# Patient Record
Sex: FEMALE | Race: WHITE | Employment: FULL TIME
[De-identification: names, ages, dates, MRNs, and addresses within clinical notes are randomized per-mention and may not be internally consistent; named-entity substitution may affect disease eponyms.]

---

## 2017-06-17 ENCOUNTER — HEALTH MAINTENANCE LETTER (OUTPATIENT)
Age: 57
End: 2017-06-17

## 2017-07-11 DIAGNOSIS — R00.2 PALPITATIONS: Primary | ICD-10-CM

## 2017-07-11 DIAGNOSIS — R94.31 ABNORMAL ELECTROCARDIOGRAM: ICD-10-CM

## 2017-07-14 ENCOUNTER — HOSPITAL ENCOUNTER (OUTPATIENT)
Dept: CARDIOLOGY | Facility: CLINIC | Age: 57
Discharge: HOME | End: 2017-07-14
Attending: PHYSICIAN ASSISTANT | Admitting: PHYSICIAN ASSISTANT
Payer: COMMERCIAL

## 2017-07-14 DIAGNOSIS — R00.2 PALPITATIONS: ICD-10-CM

## 2017-07-14 DIAGNOSIS — R94.31 ABNORMAL ELECTROCARDIOGRAM: ICD-10-CM

## 2017-07-14 PROCEDURE — 93225 XTRNL ECG REC<48 HRS REC: CPT

## 2017-07-14 PROCEDURE — 93227 XTRNL ECG REC<48 HR R&I: CPT | Performed by: INTERNAL MEDICINE

## 2017-07-24 ENCOUNTER — PRE VISIT (OUTPATIENT)
Dept: CARDIOLOGY | Facility: CLINIC | Age: 57
End: 2017-07-24

## 2017-07-31 ENCOUNTER — OFFICE VISIT (OUTPATIENT)
Dept: CARDIOLOGY | Facility: CLINIC | Age: 57
End: 2017-07-31
Payer: COMMERCIAL

## 2017-07-31 VITALS
DIASTOLIC BLOOD PRESSURE: 72 MMHG | SYSTOLIC BLOOD PRESSURE: 116 MMHG | WEIGHT: 186 LBS | BODY MASS INDEX: 28.19 KG/M2 | HEIGHT: 68 IN | HEART RATE: 60 BPM

## 2017-07-31 DIAGNOSIS — R00.2 PALPITATIONS: Primary | ICD-10-CM

## 2017-07-31 PROCEDURE — 93000 ELECTROCARDIOGRAM COMPLETE: CPT | Performed by: INTERNAL MEDICINE

## 2017-07-31 PROCEDURE — 99204 OFFICE O/P NEW MOD 45 MIN: CPT | Mod: 25 | Performed by: INTERNAL MEDICINE

## 2017-07-31 RX ORDER — ACYCLOVIR 800 MG/1
TABLET ORAL
Refills: 4 | COMMUNITY
Start: 2017-07-08

## 2017-07-31 RX ORDER — LEVOTHYROXINE SODIUM 125 UG/1
TABLET ORAL
Refills: 0 | COMMUNITY
Start: 2017-07-10

## 2017-07-31 RX ORDER — ALBUTEROL SULFATE 90 UG/1
AEROSOL, METERED RESPIRATORY (INHALATION)
Refills: 1 | COMMUNITY
Start: 2016-09-14

## 2017-07-31 NOTE — MR AVS SNAPSHOT
"              After Visit Summary   7/31/2017    Radha Turner    MRN: [id number]           Patient Information     Date Of Birth          1960        Visit Information        Provider Department      7/31/2017 2:45 PM John Jimenez MD [facility]        Today's Diagnoses     Palpitations    -  1       Follow-ups after your visit        Future tests that were ordered for you today     Open Future Orders        Priority Expected Expires Ordered    Echocardiogram Routine 8/7/2017 7/31/2018 7/31/2017            Who to contact     If you have questions or need follow up information about today's clinic visit or your schedule please contact [facility] directly at [phone].  Normal or non-critical lab and imaging results will be communicated to you by MyChart, letter or phone within 4 business days after the clinic has received the results. If you do not hear from us within 7 days, please contact the clinic through MyChart or phone. If you have a critical or abnormal lab result, we will notify you by phone as soon as possible.  Submit refill requests through Friday or call your pharmacy and they will forward the refill request to us. Please allow 3 business days for your refill to be completed.          Additional Information About Your Visit        MyChart Information     Friday lets you send messages to your doctor, view your test results, renew your prescriptions, schedule appointments and more. To sign up, go to [email or web address] . Click on \"Log in\" on the left side of the screen, which will take you to the Welcome page. Then click on \"Sign up Now\" on the right side of the page.     You will be asked to enter the access code listed below, as well as some personal information. Please follow the directions to create your username and password.     Your access code is: [email or web address]  Expires: 10/29/2017  3:10 PM     Your " "access code will  in 90 days. If you need help or a new code, please call your [place] clinic or [phone].        Care EveryWhere ID     This is your Care EveryWhere ID. This could be used by other organizations to access your [place] medical records  [id number]        Your Vitals Were     Pulse Height BMI (Body Mass Index)             60 1.715 m (5' 7.5\") 28.7 kg/m2          Blood Pressure from Last 3 Encounters:   17 116/72    Weight from Last 3 Encounters:   17 84.4 kg (186 lb)              We Performed the Following     EKG 12-lead complete w/read - Clinics (performed today)        Primary Care Provider Office Phone # Fax #    Randa Yarbrough [phone]       [facility] [address] [facility] [address]  [place] [address]        Equal Access to Services     [facility]: Hadii aad ku hadasho Soomaali, waaxda luqadaha, qaybta kaalmada adeegyada, waxay idiin hayaan adetimothy gentile . So [facility] [phone].    ATENCIÓN: Si habla español, tiene a hebert disposición servicios gratuitos de asistencia lingüística. Cosmo al [phone].    We comply with applicable federal civil rights laws and [place] laws. We do not discriminate on the basis of race, color, national origin, age, disability sex, sexual orientation or gender identity.            Thank you!     Thank you for choosing [facility] [place]  for your care. Our goal is always to provide you with excellent care. Hearing back from our patients is one way we can continue to improve our services. Please take a few minutes to complete the written survey that you may receive in the mail after your visit with us. Thank you!             Your Updated Medication List - Protect others around you: Learn how to safely use, store and throw away your medicines at www.disposemymeds.org.          This list is accurate as of: 17  3:10 PM.  Always use your most recent med list.             "       Brand Name Dispense Instructions for use Diagnosis    acyclovir 800 MG tablet    ZOVIRAX     TK 1 T PO BID FOR 3 TO 5 DAYS PRN.        albuterol 108 (90 BASE) MCG/ACT Inhaler   Generic drug:  albuterol      INL 2 PFS PO QID PRN        levothyroxine 125 MCG tablet    SYNTHROID/LEVOTHROID     TK 1 T PO D

## 2017-07-31 NOTE — LETTER
7/31/2017    [facility]   [address]  [facility] [address]    RE: Radha Turner       Dear Colleague,    I had the pleasure of seeing Radha Turner in the [facility].    HPI and Plan:   She has recurrent short lasting palpitation for the last few weeks. Presented to PMD's office with worsening symptoms a few days ago. ECG recording at that time showed sinus bradycardia and PACs. The dose of Levothyroxine has been reduced since. She has felt better with only occasionally short lasting palpitation.  No other severe symptoms.  Denies fatigue or SOB. No syncope. Holter showed average HR 58 bpm, maximal  bpm. Pauses <2.2 sec.  1655 PACs, with short runs of atrial tachycardia. Palpitation correlated with PACs.    Today's ECG normal.    Ordered an echo.  Recommend observation for now. Can accept occasional short lasting palpitation.  Will be very cautious about using antiarrhythmic drug because of sinus bradycardia.  No restriction for activities for now.  Follow up PRN.    Orders Placed This Encounter   Procedures     EKG 12-lead complete w/read - Clinics (performed today)     Echocardiogram       Orders Placed This Encounter   Medications     levothyroxine (SYNTHROID/LEVOTHROID) 125 MCG tablet     Sig: TK 1 T PO D     Refill:  0     ALBUTEROL 108 (90 BASE) MCG/ACT inhaler     Sig: INL 2 PFS PO QID PRN     Refill:  1     acyclovir (ZOVIRAX) 800 MG tablet     Sig: TK 1 T PO BID FOR 3 TO 5 DAYS PRN.     Refill:  4       There are no discontinued medications.      Encounter Diagnosis   Name Primary?     Palpitations Yes       CURRENT MEDICATIONS:  Current Outpatient Prescriptions   Medication Sig Dispense Refill     levothyroxine (SYNTHROID/LEVOTHROID) 125 MCG tablet TK 1 T PO D  0     ALBUTEROL 108 (90 BASE) MCG/ACT inhaler INL 2 PFS PO QID PRN  1     acyclovir (ZOVIRAX) 800 MG tablet TK 1 T PO BID FOR 3 TO 5 DAYS PRN.  4       ALLERGIES    "Not on File    PAST MEDICAL HISTORY:  Past Medical History:   Diagnosis Date     Atrial tachycardia (H)     short runs     Hypothyroid      Sinus bradycardia        PAST SURGICAL HISTORY:  History reviewed. No pertinent surgical history.    FAMILY HISTORY:  Family History   Problem Relation Age of Onset     Suicide Mother      Parkinsonism Father      Heart Failure Maternal Grandmother      Heart Failure Maternal Aunt        SOCIAL HISTORY:  Social History     Social History     Marital status:      Spouse name: N/A     Number of children: N/A     Years of education: N/A     Social History Main Topics     Smoking status: Former Smoker     Smokeless tobacco: Never Used     Alcohol use Yes      Comment: socially on weekends     Drug use: Yes     Special: Marijuana     Sexual activity: Not Asked     Other Topics Concern     Parent/Sibling W/ Cabg, Mi Or Angioplasty Before 65f 55m? No     Social History Narrative     None       Review of Systems:  Skin:  Negative       Eyes:  Positive for glasses    ENT:  Negative      Respiratory:  Negative       Cardiovascular:    Positive for;palpitations;dizziness positional dizziness  Gastroenterology: Negative      Genitourinary:  Negative      Musculoskeletal:  Negative      Neurologic:  Negative      Psychiatric:  Negative      Heme/Lymph/Imm:  Negative      Endocrine:  Positive for thyroid disorder      Physical Exam:  Vitals: /72  Pulse 60  Ht 1.715 m (5' 7.5\")  Wt 84.4 kg (186 lb)  BMI 28.7 kg/m2    Constitutional:  cooperative, alert and oriented, well developed, well nourished, in no acute distress        Skin:  warm and dry to the touch, no apparent skin lesions or masses noted        Head:  normocephalic, no masses or lesions        Eyes:  pupils equal and round, conjunctivae and lids unremarkable, sclera white, no xanthalasma, EOMS intact, no nystagmus        ENT:  no pallor or cyanosis, dentition good        Neck:  carotid pulses are full and equal " bilaterally, JVP normal, no carotid bruit, no thyromegaly        Chest:  normal breath sounds, clear to auscultation, normal A-P diameter, normal symmetry, normal respiratory excursion, no use of accessory muscles          Cardiac: regular rhythm, normal S1/S2, no S3 or S4, apical impulse not displaced, no murmurs, gallops or rubs                  Abdomen:  abdomen soft, non-tender, BS normoactive, no mass, no HSM, no bruits        Vascular: pulses full and equal, no bruits auscultated                                        Extremities and Back:  no deformities, clubbing, cyanosis, erythema observed              Neurological:  affect appropriate, oriented to time, person and place        Thank you for allowing me to participate in the care of your patient.    Sincerely,     John Jimenez MD     [facility]

## 2017-07-31 NOTE — PROGRESS NOTES
HPI and Plan:   She has recurrent short lasting palpitation for the last few weeks. Presented to PMD's office with worsening symptoms a few days ago. ECG recording at that time showed sinus bradycardia and PACs. The dose of Levothyroxine has been reduced since. She has felt better with only occasionally short lasting palpitation.  No other severe symptoms.  Denies fatigue or SOB. No syncope. Holter showed average HR 58 bpm, maximal  bpm. Pauses <2.2 sec.  1655 PACs, with short runs of atrial tachycardia. Palpitation correlated with PACs.    Today's ECG normal.    Ordered an echo.  Recommend observation for now. Can accept occasional short lasting palpitation.  Will be very cautious about using antiarrhythmic drug because of sinus bradycardia.  No restriction for activities for now.  Follow up PRN.    Orders Placed This Encounter   Procedures     EKG 12-lead complete w/read - Clinics (performed today)     Echocardiogram       Orders Placed This Encounter   Medications     levothyroxine (SYNTHROID/LEVOTHROID) 125 MCG tablet     Sig: TK 1 T PO D     Refill:  0     ALBUTEROL 108 (90 BASE) MCG/ACT inhaler     Sig: INL 2 PFS PO QID PRN     Refill:  1     acyclovir (ZOVIRAX) 800 MG tablet     Sig: TK 1 T PO BID FOR 3 TO 5 DAYS PRN.     Refill:  4       There are no discontinued medications.      Encounter Diagnosis   Name Primary?     Palpitations Yes       CURRENT MEDICATIONS:  Current Outpatient Prescriptions   Medication Sig Dispense Refill     levothyroxine (SYNTHROID/LEVOTHROID) 125 MCG tablet TK 1 T PO D  0     ALBUTEROL 108 (90 BASE) MCG/ACT inhaler INL 2 PFS PO QID PRN  1     acyclovir (ZOVIRAX) 800 MG tablet TK 1 T PO BID FOR 3 TO 5 DAYS PRN.  4       ALLERGIES   Not on File    PAST MEDICAL HISTORY:  Past Medical History:   Diagnosis Date     Atrial tachycardia (H)     short runs     Hypothyroid      Sinus bradycardia        PAST SURGICAL HISTORY:  History reviewed. No pertinent surgical history.    FAMILY  "HISTORY:  Family History   Problem Relation Age of Onset     Suicide Mother      Parkinsonism Father      Heart Failure Maternal Grandmother      Heart Failure Maternal Aunt        SOCIAL HISTORY:  Social History     Social History     Marital status:      Spouse name: N/A     Number of children: N/A     Years of education: N/A     Social History Main Topics     Smoking status: Former Smoker     Smokeless tobacco: Never Used     Alcohol use Yes      Comment: socially on weekends     Drug use: Yes     Special: Marijuana     Sexual activity: Not Asked     Other Topics Concern     Parent/Sibling W/ Cabg, Mi Or Angioplasty Before 65f 55m? No     Social History Narrative     None       Review of Systems:  Skin:  Negative       Eyes:  Positive for glasses    ENT:  Negative      Respiratory:  Negative       Cardiovascular:    Positive for;palpitations;dizziness positional dizziness  Gastroenterology: Negative      Genitourinary:  Negative      Musculoskeletal:  Negative      Neurologic:  Negative      Psychiatric:  Negative      Heme/Lymph/Imm:  Negative      Endocrine:  Positive for thyroid disorder      Physical Exam:  Vitals: /72  Pulse 60  Ht 1.715 m (5' 7.5\")  Wt 84.4 kg (186 lb)  BMI 28.7 kg/m2    Constitutional:  cooperative, alert and oriented, well developed, well nourished, in no acute distress        Skin:  warm and dry to the touch, no apparent skin lesions or masses noted        Head:  normocephalic, no masses or lesions        Eyes:  pupils equal and round, conjunctivae and lids unremarkable, sclera white, no xanthalasma, EOMS intact, no nystagmus        ENT:  no pallor or cyanosis, dentition good        Neck:  carotid pulses are full and equal bilaterally, JVP normal, no carotid bruit, no thyromegaly        Chest:  normal breath sounds, clear to auscultation, normal A-P diameter, normal symmetry, normal respiratory excursion, no use of accessory muscles          Cardiac: regular rhythm, " normal S1/S2, no S3 or S4, apical impulse not displaced, no murmurs, gallops or rubs                  Abdomen:  abdomen soft, non-tender, BS normoactive, no mass, no HSM, no bruits        Vascular: pulses full and equal, no bruits auscultated                                        Extremities and Back:  no deformities, clubbing, cyanosis, erythema observed              Neurological:  affect appropriate, oriented to time, person and place              CC  No referring provider defined for this encounter.

## 2017-08-01 ENCOUNTER — HOSPITAL ENCOUNTER (OUTPATIENT)
Dept: CARDIOLOGY | Facility: CLINIC | Age: 57
Discharge: HOME | End: 2017-08-01
Attending: INTERNAL MEDICINE | Admitting: INTERNAL MEDICINE
Payer: COMMERCIAL

## 2017-08-01 DIAGNOSIS — R00.2 PALPITATIONS: ICD-10-CM

## 2017-08-01 PROCEDURE — 93306 TTE W/DOPPLER COMPLETE: CPT | Mod: 26 | Performed by: INTERNAL MEDICINE

## 2017-08-01 PROCEDURE — 93306 TTE W/DOPPLER COMPLETE: CPT

## 2017-08-02 ENCOUNTER — TELEPHONE (OUTPATIENT)
Dept: CARDIOLOGY | Facility: CLINIC | Age: 57
End: 2017-08-02

## 2017-08-02 NOTE — TELEPHONE ENCOUNTER
Complete Echo Adult.  _____________________________________________________________________________  __        Interpretation Summary     Left ventricular systolic function is normal.  The visual ejection fraction is estimated at 60-65%.  Normal left ventricular diastolic function  The right ventricle is normal in structure, function and size.  Normal biatrial dimensions.  No hemodynamically significant valvular abnormalities.  Normal transthoracic echocardiogram. There is no comparison study available.  _____________________________________________________________________________    Writer attempted to call pt with results of echo as above, but no answer. VM left to return our phone call. CLEO Campoverde RN.

## 2017-08-02 NOTE — TELEPHONE ENCOUNTER
Patient calling back for echo results. Reviewed the findings with her. She asked if this would go to [facility]. No but  I will fax over copy of results. Laura RAIN

## 2018-09-10 ENCOUNTER — HOSPITAL ENCOUNTER (OUTPATIENT)
Dept: MAMMOGRAPHY | Facility: CLINIC | Age: 58
Discharge: HOME | End: 2018-09-10
Attending: PHYSICIAN ASSISTANT | Admitting: PHYSICIAN ASSISTANT
Payer: COMMERCIAL

## 2018-09-10 DIAGNOSIS — Z12.31 VISIT FOR SCREENING MAMMOGRAM: ICD-10-CM

## 2018-09-10 PROCEDURE — 77067 SCR MAMMO BI INCL CAD: CPT

## 2019-09-11 ENCOUNTER — HOSPITAL ENCOUNTER (OUTPATIENT)
Dept: MAMMOGRAPHY | Facility: CLINIC | Age: 59
Discharge: HOME | End: 2019-09-11
Attending: PHYSICIAN ASSISTANT | Admitting: PHYSICIAN ASSISTANT
Payer: COMMERCIAL

## 2019-09-11 DIAGNOSIS — Z12.31 VISIT FOR SCREENING MAMMOGRAM: ICD-10-CM

## 2019-09-11 PROCEDURE — 77063 BREAST TOMOSYNTHESIS BI: CPT

## 2020-10-01 ENCOUNTER — HOSPITAL ENCOUNTER (OUTPATIENT)
Dept: MAMMOGRAPHY | Facility: CLINIC | Age: 60
Discharge: HOME | End: 2020-10-01
Attending: PHYSICIAN ASSISTANT | Admitting: PHYSICIAN ASSISTANT
Payer: COMMERCIAL

## 2020-10-01 DIAGNOSIS — Z12.31 VISIT FOR SCREENING MAMMOGRAM: ICD-10-CM

## 2020-10-01 PROCEDURE — 77067 SCR MAMMO BI INCL CAD: CPT

## 2021-04-14 ENCOUNTER — OFFICE VISIT (OUTPATIENT)
Dept: NURSING | Facility: CLINIC | Age: 61
End: 2021-04-14
Payer: COMMERCIAL

## 2021-04-14 PROCEDURE — 0001A PR COVID VAC PFIZER DIL RECON 30 MCG/0.3 ML IM: CPT

## 2021-04-14 PROCEDURE — 91300 PR COVID VAC PFIZER DIL RECON 30 MCG/0.3 ML IM: CPT

## 2021-05-05 ENCOUNTER — IMMUNIZATION (OUTPATIENT)
Dept: NURSING | Facility: CLINIC | Age: 61
End: 2021-05-05
Attending: INTERNAL MEDICINE
Payer: COMMERCIAL

## 2021-05-05 PROCEDURE — 91300 PR COVID VAC PFIZER DIL RECON 30 MCG/0.3 ML IM: CPT

## 2021-05-05 PROCEDURE — 0002A PR COVID VAC PFIZER DIL RECON 30 MCG/0.3 ML IM: CPT

## 2021-05-15 ENCOUNTER — HEALTH MAINTENANCE LETTER (OUTPATIENT)
Age: 61
End: 2021-05-15

## 2021-09-04 ENCOUNTER — HEALTH MAINTENANCE LETTER (OUTPATIENT)
Age: 61
End: 2021-09-04

## 2021-10-20 ENCOUNTER — HOSPITAL ENCOUNTER (OUTPATIENT)
Dept: MAMMOGRAPHY | Facility: CLINIC | Age: 61
Discharge: HOME | End: 2021-10-20
Attending: PHYSICIAN ASSISTANT | Admitting: PHYSICIAN ASSISTANT
Payer: COMMERCIAL

## 2021-10-20 DIAGNOSIS — Z12.31 VISIT FOR SCREENING MAMMOGRAM: ICD-10-CM

## 2021-10-20 PROCEDURE — 77063 BREAST TOMOSYNTHESIS BI: CPT

## 2022-06-11 ENCOUNTER — HEALTH MAINTENANCE LETTER (OUTPATIENT)
Age: 62
End: 2022-06-11

## 2022-10-22 ENCOUNTER — HEALTH MAINTENANCE LETTER (OUTPATIENT)
Age: 62
End: 2022-10-22

## 2022-11-04 ENCOUNTER — HOSPITAL ENCOUNTER (OUTPATIENT)
Dept: MAMMOGRAPHY | Facility: CLINIC | Age: 62
Discharge: HOME | End: 2022-11-04
Attending: PHYSICIAN ASSISTANT | Admitting: PHYSICIAN ASSISTANT
Payer: COMMERCIAL

## 2022-11-04 DIAGNOSIS — Z12.31 VISIT FOR SCREENING MAMMOGRAM: ICD-10-CM

## 2022-11-04 PROCEDURE — 77067 SCR MAMMO BI INCL CAD: CPT

## 2023-06-18 ENCOUNTER — HEALTH MAINTENANCE LETTER (OUTPATIENT)
Age: 63
End: 2023-06-18

## 2023-11-27 ENCOUNTER — HOSPITAL ENCOUNTER (OUTPATIENT)
Dept: MAMMOGRAPHY | Facility: CLINIC | Age: 63
Discharge: HOME | End: 2023-11-27
Attending: PHYSICIAN ASSISTANT | Admitting: PHYSICIAN ASSISTANT
Payer: COMMERCIAL

## 2023-11-27 DIAGNOSIS — Z12.31 VISIT FOR SCREENING MAMMOGRAM: ICD-10-CM

## 2023-11-27 PROCEDURE — 77067 SCR MAMMO BI INCL CAD: CPT

## 2024-08-11 ENCOUNTER — HEALTH MAINTENANCE LETTER (OUTPATIENT)
Age: 64
End: 2024-08-11

## 2024-10-02 ENCOUNTER — HOSPITAL ENCOUNTER (EMERGENCY)
Facility: CLINIC | Age: 64
Discharge: HOME | End: 2024-10-02
Attending: EMERGENCY MEDICINE | Admitting: EMERGENCY MEDICINE
Payer: COMMERCIAL

## 2024-10-02 VITALS
HEIGHT: 67 IN | OXYGEN SATURATION: 98 % | RESPIRATION RATE: 18 BRPM | TEMPERATURE: 98.1 F | DIASTOLIC BLOOD PRESSURE: 75 MMHG | HEART RATE: 66 BPM | BODY MASS INDEX: 31.75 KG/M2 | SYSTOLIC BLOOD PRESSURE: 126 MMHG | WEIGHT: 202.3 LBS

## 2024-10-02 DIAGNOSIS — H20.9 IRITIS: ICD-10-CM

## 2024-10-02 PROCEDURE — 99284 EMERGENCY DEPT VISIT MOD MDM: CPT | Performed by: EMERGENCY MEDICINE

## 2024-10-02 PROCEDURE — 250N000009 HC RX 250: Performed by: EMERGENCY MEDICINE

## 2024-10-02 RX ORDER — PROPARACAINE HYDROCHLORIDE 5 MG/ML
1 SOLUTION/ DROPS OPHTHALMIC ONCE
Status: DISCONTINUED | OUTPATIENT
Start: 2024-10-02 | End: 2024-10-03 | Stop reason: HOSPADM

## 2024-10-02 RX ORDER — CYCLOPENTOLATE HYDROCHLORIDE 10 MG/ML
1 SOLUTION/ DROPS OPHTHALMIC 2 TIMES DAILY
Qty: 5 ML | Refills: 0 | Status: SHIPPED | OUTPATIENT
Start: 2024-10-02 | End: 2024-10-06

## 2024-10-02 RX ORDER — PREDNISOLONE ACETATE 10 MG/ML
1-2 SUSPENSION/ DROPS OPHTHALMIC
Qty: 15 ML | Refills: 0 | Status: SHIPPED | OUTPATIENT
Start: 2024-10-02 | End: 2024-10-09

## 2024-10-02 RX ORDER — CYCLOPENTOLATE HYDROCHLORIDE 10 MG/ML
1 SOLUTION/ DROPS OPHTHALMIC 2 TIMES DAILY
Status: DISCONTINUED | OUTPATIENT
Start: 2024-10-02 | End: 2024-10-03 | Stop reason: HOSPADM

## 2024-10-02 RX ORDER — PREDNISOLONE ACETATE 10 MG/ML
1 SUSPENSION/ DROPS OPHTHALMIC
Status: DISCONTINUED | OUTPATIENT
Start: 2024-10-02 | End: 2024-10-03 | Stop reason: HOSPADM

## 2024-10-02 RX ADMIN — PREDNISOLONE ACETATE 1 DROP: 10 SUSPENSION/ DROPS OPHTHALMIC at 21:56

## 2024-10-02 RX ADMIN — CYCLOPENTOLATE HYDROCHLORIDE 1 DROP: 10 SOLUTION/ DROPS OPHTHALMIC at 21:56

## 2024-10-02 ASSESSMENT — COLUMBIA-SUICIDE SEVERITY RATING SCALE - C-SSRS
1. IN THE PAST MONTH, HAVE YOU WISHED YOU WERE DEAD OR WISHED YOU COULD GO TO SLEEP AND NOT WAKE UP?: NO
6. HAVE YOU EVER DONE ANYTHING, STARTED TO DO ANYTHING, OR PREPARED TO DO ANYTHING TO END YOUR LIFE?: NO
2. HAVE YOU ACTUALLY HAD ANY THOUGHTS OF KILLING YOURSELF IN THE PAST MONTH?: NO

## 2024-10-02 ASSESSMENT — VISUAL ACUITY
OU: 20/10;WITH CORRECTIVE LENSES
OS: 20/15;WITH CORRECTIVE LENSES
OD: 20/25;WITH CORRECTIVE LENSES

## 2024-10-02 ASSESSMENT — ACTIVITIES OF DAILY LIVING (ADL)
ADLS_ACUITY_SCORE: 35

## 2024-10-03 ENCOUNTER — TELEPHONE (OUTPATIENT)
Dept: OPHTHALMOLOGY | Facility: CLINIC | Age: 64
End: 2024-10-03
Payer: COMMERCIAL

## 2024-10-03 NOTE — DISCHARGE INSTRUCTIONS
You have been seen in the emergency department today for iritis.  This is inflammation of the inside of the eye.  We have advised that you take the eyedrops that were recommended by the eye specialist/ophthalmologist.  You should receive a phone call tomorrow from the Eye Clinic to schedule follow-up in the Eye Clinic.  If you do not hear from them, please call the phone number below.  They should schedule a follow-up appointment for you in 3 to 5 days.    Eye Clinic (phone: [phone])

## 2024-10-03 NOTE — CONSULTS
OPHTHALMOLOGY CONSULT NOTE  10/02/24    Patient: Radha Turner  Consulted by: ED  Reason for Consult: blurry vision    HISTORY OF PRESENTING ILLNESS:     Radha Turner is a 64 year old female with history of Hashimoto's thyroiditis who presents today with sudden onset blurry vision of the right eye. Patient states that she was at work when she all of a sudden noticed that her right eye was blurry. She thought she got something in her eye, so washed out her eye with no improvement. Then she got home and noticed that there was a single blob blood on her iris that got smaller over time. States that this blob was not bulging out but rather inside. Patient denies any trauma.     Patient presented the urgent care and now presents to the ED. Patient denies any eye pain. Patient denies any double vision. Patient endorses a mild frontal headache that started after she got home. GCA ROS otherwise negative.        Review of systems were otherwise negative except for that which has been stated above.      OCULAR/MEDICAL/SURGICAL HISTORIES:     Past Ocular History:  Last eye exam: 1.5 years ago  Prior eye surgery/laser: Denies   Contact lens wear: Denies   Glasses: Yes   Eyedrops: None    Family History:  No FOHx of blindness, glaucoma, or AMD      Social History:  Social History     Tobacco Use    Smoking status: Former    Smokeless tobacco: Never   Substance Use Topics    Alcohol use: Yes     Comment: socially on weekends    Drug use: Yes     Types: Marijuana         Past Medical History:   Diagnosis Date    Atrial tachycardia (H)     short runs    Hypothyroid     Sinus bradycardia        No past surgical history on file.    EXAMINATION:     Base Eye Exam       Visual Acuity (Snellen - Linear)         Right Left    Dist sc 20/20 20/20              Tonometry (Tonopen, 8:10 PM)         Right Left    Pressure 16 14              Pupils         Pupils APD    Right PERRL None    Left PERRL None              Visual Fields          Left Right     Full Full              Extraocular Movement         Right Left     Full, Ortho Full, Ortho              Dilation       Both eyes: 1.0% Mydriacyl, 2.5% Gareth Synephrine @ 8:00 PM                  Additional Tests       Color         Right Left    Ishihara 11/11 11/11                  Slit Lamp and Fundus Exam       External Exam         Right Left    External Normal Normal              Slit Lamp Exam         Right Left    Lids/Lashes Normal Normal    Conjunctiva/Sclera White and quiet White and quiet    Cornea Clear Clear    Anterior Chamber 4+ mixed cells, a strand of ?pupillary membrane transversing from 9 o'clock to 3 o'clock Deep and quiet    Iris Round and reactive -> Dilated, no synechiae Round and reactive -> Dilated, no synechiae    Lens 1+ NS 1+ NS    Anterior Vitreous Normal; no cell Normal; no cell              Fundus Exam         Right Left    Disc Normal; sharp margins, pink Normal; sharp margins, pink    C/D Ratio 0.2 0.15    Macula Normal Normal    Vessels Normal; no sheathing Normal; no sheathing    Periphery Normal Normal                    Labs/Studies/Imaging Performed:  None     ASSESSMENT/PLAN:     Radha Turner is a 64 year old female who presents with     # Iritis, right eye   - Patient reports sudden onset of vision changes at 3 PM today  - Denies any trauma   - Denies any history of rashes, bowel movement changes (did have diarrhea over the summer but improved), hematuria, recent URI, exposure to ticks, exposure to incarcerated individuals, recent travels; uveitis ROS otherwise negative  - No signs of intermediate or posterior uveitis  - Given first time episode of unilateral iritis, will treat without work up     RECOMMENDATIONS:  - Pred Forte every 2 hours while awake to the right eye  - Cyclopentolate twice a day to the right eye  - Follow up in eye clinic in 3-5 days (ophthalmology will coordinate, [facility]  9th floor)    It is our pleasure to participate in  this patient's care and treatment. Please contact us with any further questions or concerns.      Cherelle Hernandez MD  Resident Physician, PGY-3  Department of Ophthalmology

## 2024-10-03 NOTE — TELEPHONE ENCOUNTER
Can we have this patient follow up in acute clinic in 3-5 days for uveitis?     -- above per on call eye provider.    I will reach out to review scheduling options.    Rick Fritz RN 7:50 AM 10/03/24

## 2024-10-03 NOTE — ED TRIAGE NOTES
Work at 1500 R eye went blurry, flushed eye with NS. Noticed red spot on iris, then lowered. Some blurriness still

## 2024-10-03 NOTE — ED PROVIDER NOTES
"ED Provider Note  [facility]      History     Chief Complaint   Patient presents with    Eye Problem     HPI    Radha Turner is a 64-year-old female with a history of hypothyroidism who presents to the emergency department today with blurry vision in her right eye. Patient does wear glasses.  She was at work, working on her computer at time of onset of symptoms a couple of hours ago.  She does not always wear her glasses when she is doing close-up work on her computer so she had her glasses off.  She noted some blurry vision in her right eye and put her glasses on, this did not resolve the blurry vision.  She thought that she had something in her eye so she went to an eyewash station at work and irrigated the eye.  This did not improve her symptoms.  She went into the bathroom and looked in the mirror and she thought she noticed a \"blood spot \"in the inferior portion of the iris.  She subsequently went to urgent care, urgent care documentation shows that they checked visual acuity which showed 20/50 vision in both eyes.  They advised her to come to the ED for further evaluation.  Patient denies any scotoma or visual field cuts.  Denies any weakness or numbness.  Has slight head pressure but denies headache, denies any eye pain.  No injury.  She is never had this before.  Denies any fevers or chills, no nasal congestion or sore throat, no cough, shortness of breath, or chest pain.  No abdominal pain, nausea, vomiting, or diarrhea.    This part of the medical record was transcribed by Pee Ng Scribe, from a dictation done by Zuleika Costello MD.      Past Medical History:   Diagnosis Date    Atrial tachycardia (H)     short runs    Hypothyroid     Sinus bradycardia        No past surgical history on file.    Family History   Problem Relation Age of Onset    Suicide Mother     Parkinsonism Father     Heart Failure Maternal Grandmother     Heart Failure Maternal Aunt  " "      Social History     Tobacco Use    Smoking status: Former    Smokeless tobacco: Never   Substance Use Topics    Alcohol use: Yes     Comment: socially on weekends         Past Medical History  Past Medical History:   Diagnosis Date    Atrial tachycardia (H)     short runs    Hypothyroid     Sinus bradycardia      No past surgical history on file.  cyclopentolate (CYCLOGYL) 1 % ophthalmic solution  levothyroxine (SYNTHROID/LEVOTHROID) 125 MCG tablet  prednisoLONE acetate (PRED FORTE) 1 % ophthalmic suspension  acyclovir (ZOVIRAX) 800 MG tablet  ALBUTEROL 108 (90 BASE) MCG/ACT inhaler      No Known Allergies  Family History  Family History   Problem Relation Age of Onset    Suicide Mother     Parkinsonism Father     Heart Failure Maternal Grandmother     Heart Failure Maternal Aunt      Social History   Social History     Tobacco Use    Smoking status: Former    Smokeless tobacco: Never   Substance Use Topics    Alcohol use: Yes     Comment: socially on weekends    Drug use: Yes     Types: Marijuana      A complete review of systems was performed with pertinent positives and negatives noted in the HPI, and all other systems negative.    Physical Exam   BP: (!) 141/75  Pulse: 53  Temp: 98.1  F (36.7  C)  Resp: 16  Height: 170.2 cm (5' 7\")  Weight: 91.8 kg (202 lb 4.8 oz)  SpO2: 99 %  Physical Exam  Vitals and nursing note reviewed.   Constitutional:       General: She is not in acute distress.     Appearance: She is not diaphoretic.      Comments: Female, alert, cooperative, no acute distress   HENT:      Head: Atraumatic.      Mouth/Throat:      Mouth: Mucous membranes are moist.      Pharynx: Oropharynx is clear. No oropharyngeal exudate.   Eyes:      General: No scleral icterus.     Conjunctiva/sclera:      Right eye: Right conjunctiva is injected.      Pupils: Pupils are equal, round, and reactive to light.      Comments: VA: R eye 20/25, L eye 20/15    Fluorescein stain shows very slight uptake medial portion " of right eye medial to the iris.  No other corneal abnormalities seen on Woods lamp or slit-lamp exam.  Anterior chamber appears to be normal.    PERRL B, EOM intact    Slightly injected conjunctiva on the right, no drainage appreciated.   Cardiovascular:      Rate and Rhythm: Normal rate.      Pulses: Normal pulses.      Heart sounds: Normal heart sounds. No murmur heard.  Pulmonary:      Effort: No respiratory distress.      Breath sounds: Normal breath sounds.   Abdominal:      General: Bowel sounds are normal.      Palpations: Abdomen is soft.      Tenderness: There is no abdominal tenderness.   Musculoskeletal:         General: No tenderness.   Skin:     General: Skin is warm.      Findings: No rash.   Neurological:      General: No focal deficit present.      Mental Status: She is alert.      GCS: GCS eye subscore is 4. GCS verbal subscore is 5. GCS motor subscore is 6.      Cranial Nerves: Cranial nerves 2-12 are intact.      Sensory: Sensation is intact.      Motor: Motor function is intact.      Coordination: Coordination is intact.         ED Course, Procedures, & Data      Procedures               No results found for any visits on 10/02/24.  Medications   proparacaine (ALCAINE) 0.5 % ophthalmic solution 1 drop (has no administration in time range)   fluorescein (FUL-BUSTER) ophthalmic strip 1 strip (has no administration in time range)   prednisoLONE acetate (PRED FORTE) 1 % ophthalmic susp 1 drop (has no administration in time range)   cyclopentolate (CYCLOGYL) 1 % ophthalmic solution 1 drop (has no administration in time range)     Labs Ordered and Resulted from Time of ED Arrival to Time of ED Departure - No data to display  No orders to display          Critical care was not performed.     Medical Decision Making  The patient's presentation was of high complexity (an acute health issue posing potential threat to life or bodily function).    The patient's evaluation involved:  review of external note(s)  from 1 sources (see separate area of note for details)  discussion of management or test interpretation with another health professional (see separate area of note for details)    The patient's management necessitated moderate risk (prescription drug management including medications given in the ED).    Assessment & Plan    Patient presents for above complaints.  On my evaluation she is alert, cooperative, no acute distress.  Visual acuity is 20/25 on the right, 20/15 on the left.  On my exam I do not see any significant issues on Woods lamp/fluorescein stain, there is a very tiny area of increased uptake noted on the very medial portion of the conjunctiva, medial to the iris.  I do not see any sign of ulcers or other corneal pathology.  I would be more concerned about a posterior problem such as vascular abnormality, CRAO, branch retinal artery occlusion, or other process.  I believe that she would benefit from a dilated eye exam so I have consulted ophthalmology.  Please see their note.  Ophthalmology  believes that she has iritis and recommends Pred forte and cyclopentolate.  They will coordinate follow-up with Eye Clinic in the next 3 to 5 days.    This part of the medical record was transcribed by Nancy Rico Medical Scribe, from a dictation done by Zuleika Costello MD.      I have reviewed the nursing notes. I have reviewed the findings, diagnosis, plan and need for follow up with the patient.    New Prescriptions    CYCLOPENTOLATE (CYCLOGYL) 1 % OPHTHALMIC SOLUTION    Place 1 drop into the right eye 2 times daily for 7 doses.    PREDNISOLONE ACETATE (PRED FORTE) 1 % OPHTHALMIC SUSPENSION    Place 1-2 drops into the right eye every 2 hours (while awake) for 7 days.       Final diagnoses:   Iritis       Zuleika Costello MD  [facility] EMERGENCY DEPARTMENT  10/2/2024     Zuleika Costello MD  10/02/24 [id number]

## 2024-10-04 NOTE — TELEPHONE ENCOUNTER
OK per Dr. Nance team for 10/7/24 at 1230.     Returned call to patient.     Patient scheduled on 10/7/24 with Dr. Nance, appointment time 1230, arrival time of 1215. Patient aware of date/time/location/duration (2-4 hrs)/parking/hospital based clinic information for appointment. Patient confirmed appointment time and all questions were answered.     Provided patient the main clinic number, cost of care line. Educated patient to call main clinic number to provider insurance.     Patient verbalized understanding and all questions were answered.     Stefanie Spencer RN 8:29 AM 10/04/24

## 2024-10-07 ENCOUNTER — OFFICE VISIT (OUTPATIENT)
Dept: OPHTHALMOLOGY | Facility: CLINIC | Age: 64
End: 2024-10-07
Attending: OPHTHALMOLOGY
Payer: COMMERCIAL

## 2024-10-07 DIAGNOSIS — H20.00 ACUTE ANTERIOR UVEITIS OF RIGHT EYE: Primary | ICD-10-CM

## 2024-10-07 DIAGNOSIS — H21.541 POSTERIOR SYNECHIAE (IRIS), RIGHT EYE: ICD-10-CM

## 2024-10-07 PROCEDURE — 99203 OFFICE O/P NEW LOW 30 MIN: CPT | Performed by: OPHTHALMOLOGY

## 2024-10-07 PROCEDURE — 99214 OFFICE O/P EST MOD 30 MIN: CPT | Performed by: OPHTHALMOLOGY

## 2024-10-07 ASSESSMENT — EXTERNAL EXAM - LEFT EYE: OS_EXAM: NORMAL

## 2024-10-07 ASSESSMENT — CONF VISUAL FIELD
OD_INFERIOR_NASAL_RESTRICTION: 0
OD_SUPERIOR_TEMPORAL_RESTRICTION: 0
OS_NORMAL: 1
METHOD: COUNTING FINGERS
OD_INFERIOR_TEMPORAL_RESTRICTION: 0
OS_SUPERIOR_TEMPORAL_RESTRICTION: 0
OS_SUPERIOR_NASAL_RESTRICTION: 0
OD_SUPERIOR_NASAL_RESTRICTION: 0
OS_INFERIOR_NASAL_RESTRICTION: 0
OS_INFERIOR_TEMPORAL_RESTRICTION: 0
OD_NORMAL: 1

## 2024-10-07 ASSESSMENT — SLIT LAMP EXAM - LIDS
COMMENTS: NORMAL
COMMENTS: NORMAL

## 2024-10-07 ASSESSMENT — REFRACTION_WEARINGRX
OD_AXIS: 095
OD_SPHERE: -2.25
OS_AXIS: 100
OD_CYLINDER: +0.50
OS_SPHERE: -2.25
OS_CYLINDER: +0.25

## 2024-10-07 ASSESSMENT — TONOMETRY
OS_IOP_MMHG: 14
IOP_METHOD: TONOPEN
OD_IOP_MMHG: 15

## 2024-10-07 ASSESSMENT — VISUAL ACUITY
OS_CC: 20/20
OD_CC+: +2
CORRECTION_TYPE: GLASSES
METHOD: SNELLEN - LINEAR
OD_CC: 20/25

## 2024-10-07 ASSESSMENT — EXTERNAL EXAM - RIGHT EYE: OD_EXAM: NORMAL

## 2024-10-07 ASSESSMENT — CUP TO DISC RATIO: OD_RATIO: 0.2

## 2024-10-07 NOTE — NURSING NOTE
Chief Complaints and History of Present Illnesses   Patient presents with    Uveitis Evaluation     Chief Complaint(s) and History of Present Illness(es)       Uveitis Evaluation              Laterality: right eye    Onset: weeks ago    Quality: Cloudiness has decreased in the right eye     Associated symptoms: photophobia and floaters (rare).  Negative for dryness, redness, tearing and flashes    Treatments tried: eye drops    Pain scale: 0/10              Comments    Stated that on Wednesday the right eye went completely cloudy.   Prednisolone q2h right eye   Red BID right eye   Sheyla Menon COT 12:25 PM October 7, 2024

## 2024-10-07 NOTE — PROGRESS NOTES
Chief Complaint/Presenting Concern: Uveitis evaluation    History of Present Illness:   Radha Turner is a 64 year old patient who presents for evaluation of acute anterior uveitis of the right eye.    This started suddenly on 10/2/24 without preceding illness. Ms. Turner did have her flu shot that morning but was not feeling flu like nor have any issues left eye. She was seen by Dr. Hernandez in the ED and found to have uveitis of the right eye and was started on drops for inflammation and pupil dilating drops.    Today, Ms. Turner reports things are getting better although still foggy. Left eye was and is fine.     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     Relevant Review of Systems: No recent vaginal sores, no mouth sores, no flu like symptoms. No recent joint pains, rashes, bug bites.    Labs: None recently viewable     Current eye related medications: Prednisolone every 2 hours right eye while awake Cyclopentolate twice daily right eye, no drops     Retina/Uveitis Imaging: None today     Assessment:    1. Acute anterior uveitis of right eye  Much improved today    2. Posterior synechiae (iris), right eye  Dilates well    Plan/Recommendations:  Discussed findings with patient. The uveitis has much improved in the right eye and this may be related to flu shot taken that day. As below, we can monitor on tapering drops without the need for escalation of treatment nor lab testing  Eye pressure is normal in each eye   Continue Cyclopentolate, red top (dilating drop) in the right eye. Please use once more in the evening  today and then each evening for 4 more days (last  dose on Friday, 10/11/24), then stop  For the steroid drop (prednisolone), let's continue 2 drops every 2 hours today. Then starting tomorrow-Friday, 10/11/24, reduce to 1 drop 6x/day for those 4 days. Then from 10/12-/10/15, please use 1 drop 4x/day. Then from 10/16-10/19, use 1 drop 2x/day then stop with last dose on 10/19/24.  No drops needed left eye   No specific reason not to get COVID shot this year or flu shot in subsequent years. Would recommend getting COVID vaccine after the 10/19/24 but also a check back after the vaccine. If you happen to get the vaccine before our visit and you have issues, let us know.     RTC Week of 11/4 tonopen, no dilation, no testing    Physician Attestation     Attending Physician Attestation:  Complete documentation of historical and exam elements from today's encounter can be found in the full encounter summary report (not reduplicated in this progress note). I personally obtained the chief complaint(s) and history of present illness. I confirmed and edited as necessary the review of systems, past medical/surgical history, family history, social history, and examination findings as documented by others; and I examined the patient myself. I personally reviewed the relevant tests, images, and reports as documented above. I formulated and edited as necessary the assessment and plan and discussed the findings and management plan with the patient and any family members present at the time of the visit.  Sandeep Nance M.D., Uveitis and Medical Retina, October 7, 2024

## 2024-10-07 NOTE — LETTER
10/7/2024       RE: Radha Turner  [address]  [facility] [address]     Dear Colleague,    Thank you for referring your patient, Radha Turner, to the [facility] [place] at [facility]. Please see a copy of my visit note below.    Chief Complaint/Presenting Concern: Uveitis evaluation    History of Present Illness:   Radha Turner is a 64 year old patient who presents for evaluation of acute anterior uveitis of the right eye.    This started suddenly on 10/2/24 without preceding illness. Ms. Turner did have her flu shot that morning but was not feeling flu like nor have any issues left eye. She was seen by Dr. Hernandez in the ED and found to have uveitis of the right eye and was started on drops for inflammation and pupil dilating drops.    Today, Ms. Turner reports things are getting better although still foggy. Left eye was and is fine.     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     Relevant Review of Systems: No recent vaginal sores, no mouth sores, no flu like symptoms. No recent joint pains, rashes, bug bites.    Labs: None recently viewable     Current eye related medications: Prednisolone every 2 hours right eye while awake Cyclopentolate twice daily right eye, no drops     Retina/Uveitis Imaging: None today     Assessment:    1. Acute anterior uveitis of right eye  Much improved today    2. Posterior synechiae (iris), right eye  Dilates well    Plan/Recommendations:  Discussed findings with patient. The uveitis has much improved in the right eye and this may be related to flu  shot taken that day. As below, we can monitor on tapering drops without the need for escalation of treatment nor lab testing  Eye pressure is normal in each eye   Continue Cyclopentolate, red top (dilating drop) in the right eye. Please use once more in the evening  today and then each evening for 4 more days (last dose on Friday, 10/11/24), then stop  For the steroid drop (prednisolone), let's continue 2 drops every 2 hours today. Then starting tomorrow-Friday, 10/11/24, reduce to 1 drop 6x/day for those 4 days. Then from 10/12-/10/15, please use 1 drop 4x/day. Then from 10/16-10/19, use 1 drop 2x/day then stop with last dose on 10/19/24.  No drops needed left eye   No specific reason not to get COVID shot this year or flu shot in subsequent years. Would recommend getting COVID vaccine after the 10/19/24 but also a check back after the vaccine. If you happen to get the vaccine before our visit and you have issues, let us know.     RTC Week of 11/4 tonopen, no dilation, no testing    Physician Attestation    Attending Physician Attestation:  Complete documentation of historical and exam elements from today's encounter can be found in the full encounter summary report (not reduplicated in this progress note). I personally obtained the chief complaint(s) and history of present illness. I confirmed and edited as necessary the review of systems, past medical/surgical history, family history, social history, and examination findings as documented by others; and I examined the patient myself. I personally reviewed the relevant tests, images, and reports as documented above. I formulated and edited as necessary the assessment and plan and discussed the findings and management plan with the patient and any family members present at the time of the visit.  Sandeep Nance M.D., Uveitis and Medical Retina, October 7, 2024     Again, thank you for allowing me to participate in the care of your patient.       Sincerely,    Sandeep Nance MD  Uveitis and Medical Retina    Department of Ophthalmology & Visual Neurosciences  [facility]

## 2024-10-07 NOTE — PATIENT INSTRUCTIONS
Continue Cyclopentolate, red top (dilating drop) in the right eye. Please use once more in the evening  today and then each evening for 4 more days (last dose on Friday, 10/11/24), then stop  For the steroid drop (prednisolone), let's continue 2 drops every 2 hours today. Then starting tomorrow-Friday, 10/11/24, reduce to 1 drop 6x/day for those 4 days. Then from 10/12-/10/15, please use 1 drop 4x/day. Then from 10/16-10/19, use 1 drop 2x/day then stop with last dose on 10/19/24.  No drops needed left eye   No specific reason not to get COVID shot this year or flu shot in subsequent years. Would recommend getting COVID vaccine after the 10/19/24 but also a check back after the vaccine. If you happen to get the vaccine before our visit and you have issues, let us know.

## 2024-12-17 ENCOUNTER — HOSPITAL ENCOUNTER (OUTPATIENT)
Dept: MAMMOGRAPHY | Facility: CLINIC | Age: 64
Discharge: HOME | End: 2024-12-17
Attending: PHYSICIAN ASSISTANT
Payer: COMMERCIAL

## 2024-12-17 DIAGNOSIS — Z12.31 SCREENING MAMMOGRAM FOR BREAST CANCER: ICD-10-CM

## 2024-12-17 PROCEDURE — 77067 SCR MAMMO BI INCL CAD: CPT

## 2024-12-17 PROCEDURE — 77063 BREAST TOMOSYNTHESIS BI: CPT

## 2025-08-16 ENCOUNTER — HEALTH MAINTENANCE LETTER (OUTPATIENT)
Age: 65
End: 2025-08-16